# Patient Record
Sex: FEMALE | Race: BLACK OR AFRICAN AMERICAN | HISPANIC OR LATINO | Employment: UNEMPLOYED | URBAN - METROPOLITAN AREA
[De-identification: names, ages, dates, MRNs, and addresses within clinical notes are randomized per-mention and may not be internally consistent; named-entity substitution may affect disease eponyms.]

---

## 2020-02-28 ENCOUNTER — HOSPITAL ENCOUNTER (EMERGENCY)
Facility: HOSPITAL | Age: 35
Discharge: HOME | End: 2020-02-29
Attending: EMERGENCY MEDICINE

## 2020-02-28 ENCOUNTER — APPOINTMENT (EMERGENCY)
Dept: RADIOLOGY | Facility: HOSPITAL | Age: 35
End: 2020-02-28

## 2020-02-28 VITALS
RESPIRATION RATE: 16 BRPM | DIASTOLIC BLOOD PRESSURE: 60 MMHG | OXYGEN SATURATION: 100 % | SYSTOLIC BLOOD PRESSURE: 116 MMHG | TEMPERATURE: 98 F | HEART RATE: 80 BPM | WEIGHT: 154.76 LBS

## 2020-02-28 DIAGNOSIS — I80.9 SUPERFICIAL PHLEBITIS: Primary | ICD-10-CM

## 2020-02-28 PROCEDURE — 99283 EMERGENCY DEPT VISIT LOW MDM: CPT

## 2020-02-28 PROCEDURE — 76881 US COMPL JOINT R-T W/IMG: CPT | Performed by: NURSE PRACTITIONER

## 2020-02-28 PROCEDURE — 73130 X-RAY EXAM OF HAND: CPT

## 2020-02-28 PROCEDURE — 99285 EMERGENCY DEPT VISIT HI MDM: CPT | Performed by: NURSE PRACTITIONER

## 2020-02-29 NOTE — ED PROVIDER NOTES
History  Chief Complaint   Patient presents with    Foreign Body in Skin     [place] and treated in hospital  IV placed in right hand  patient concerned piece of IV catheter stuck in had  27year old female presents with cc of pain in right hand after having an IV catheter removed on Tuesday of this week while she was in the [facility]  [place] of this [place]  She reports she was recently [place], ended up being treated for abdominal pain in a medical facility there, and had an IV which was removed  She reports since the IV was removed, it has felt like something hard is stuck in the vein  She has pain at the site which worsens with flexion and extension of the right wrist and c/o pain when pressure is applied to the site  She works as a  and said the right hand pain was worse during work  She reports keeping her hand still makes the pain better  She has not done anything to treat the pain  She denies fever, CP, SOB, or right hand swelling/redness/warmth/drainage  History provided by:  Patient   used: No    Foreign Body in Skin       None       History reviewed  No pertinent past medical history  History reviewed  No pertinent surgical history  History reviewed  No pertinent family history  I have reviewed and agree with the history as documented  E-Cigarette/Vaping     E-Cigarette/Vaping Substances     Social History     Tobacco Use    Smoking status: Never Smoker    Smokeless tobacco: Never Used   Substance Use Topics    Alcohol use: Not Currently    Drug use: Not Currently       Review of Systems   Constitutional: Negative  HENT: Negative  Eyes: Negative  Respiratory: Negative  Cardiovascular: Negative  Gastrointestinal: Negative  Endocrine: Negative  Genitourinary: Negative  Musculoskeletal: Negative  Skin: Positive for wound   Negative for color change, pallor and rash  Reports painful area on right hand where IV was removed during recent hospitalization in [facility]  Allergic/Immunologic: Negative  Neurological: Negative  Hematological: Negative  Psychiatric/Behavioral: Negative  Physical Exam  Physical Exam   Constitutional: She is oriented to person, place, and time  She appears well-developed and well-nourished  No distress  HENT:   Head: Normocephalic and atraumatic  Eyes: Pupils are equal, round, and reactive to light  EOM are normal    Neck: Normal range of motion  Neck supple  Cardiovascular: Normal rate, regular rhythm, normal heart sounds and intact distal pulses  Pulmonary/Chest: Effort normal and breath sounds normal  No respiratory distress  Musculoskeletal: Normal range of motion  Right hand: She exhibits tenderness  She exhibits normal range of motion, normal capillary refill and no laceration  Normal strength noted  She exhibits no wrist extension trouble  Hands:  Neurological: She is alert and oriented to person, place, and time  Skin: Skin is warm and dry  Capillary refill takes less than 2 seconds  She is not diaphoretic  Psychiatric: She has a normal mood and affect  Her behavior is normal  Judgment and thought content normal    Nursing note and vitals reviewed        Vital Signs  ED Triage Vitals [02/28/20 2227]   Temperature Pulse Respirations Blood Pressure SpO2   98 °F (36 7 °C) 80 16 116/60 100 %      Temp Source Heart Rate Source Patient Position - Orthostatic VS BP Location FiO2 (%)   Temporal Monitor Sitting Right arm --      Pain Score       --           Vitals:    02/28/20 2227   BP: 116/60   Pulse: 80   Patient Position - Orthostatic VS: Sitting         Visual Acuity      ED Medications  Medications - No data to display    Diagnostic Studies  Results Reviewed     None                 XR hand 3+ views RIGHT   ED Interpretation by MICHAEL Sharif (02/29 0031) Preliminary reading   No acute process seen waiting on rad read                  Procedures  Procedures         ED Course  ED Course as of Feb 29 0305   Sat Feb 29, 2020   0037 Preliminary read of xray negative for FB  Bedside US for FB negative  Informed pt and male with her to follow up with PCP or general surgeon  I believe that pt symptoms are compatible with superficial cellulitis vs ruptured vein  Pt instructed to apply ice and take tylenol or motrin  Pt verbalizes understanding of dc instructions and follow up            /60 (BP Location: Right arm)   Pulse 80   Temp 98 °F (36 7 °C) (Temporal)   Resp 16   Wt 70 2 kg (154 lb 12 2 oz)   SpO2 100%                           MDM  Number of Diagnoses or Management Options     Amount and/or Complexity of Data Reviewed  Clinical lab tests: ordered and reviewed  Tests in the radiology section of CPT®: ordered and reviewed  Tests in the medicine section of CPT®: ordered and reviewed  Review and summarize past medical records: yes          Disposition  Final diagnoses:   Superficial phlebitis - of right hand     Time reflects when diagnosis was documented in both MDM as applicable and the Disposition within this note     Time User Action Codes Description Comment    2/29/2020 12:43 AM Shell Hunt Add [I80 9] Superficial phlebitis     2/29/2020 12:43 AM Shell Hunt Modify [I80 9] Superficial phlebitis of right hand      ED Disposition     ED Disposition Condition Date/Time Comment    Discharge Stable Sat Feb 29, 2020 12:42 AM Marleni Jefferson discharge to home/self care              Follow-up Information     Follow up With Specialties Details Why Contact Info Additional [address] [facility] Emergency Department Emergency Medicine  If symptoms worsen [place] [phone]  [phone] [place] ED, [address] , Nicole Jacob, [address] [facility], [place] General Surgery, Wound Care If symptoms worsen [address] [facility]  [address]  call to find a PCP [address], [address] [id number]  [address], [address], [address] [place], [place], [address]          There are no discharge medications for this patient  No discharge procedures on file      PDMP Review     None          ED Provider  Electronically Signed by           Jeronimo Hannon  02/29/20 1990

## 2020-02-29 NOTE — DISCHARGE INSTRUCTIONS
You appear to have phlebitis of the hand vein  You are to apply ice and take tylenol or motrin  You are to follow up with general surgery or find a PCP for follow up  If the hand becomes red, hot or angry looking - return to the ED